# Patient Record
Sex: FEMALE | Race: WHITE | HISPANIC OR LATINO | Employment: UNEMPLOYED | URBAN - METROPOLITAN AREA
[De-identification: names, ages, dates, MRNs, and addresses within clinical notes are randomized per-mention and may not be internally consistent; named-entity substitution may affect disease eponyms.]

---

## 2024-11-19 ENCOUNTER — HOSPITAL ENCOUNTER (EMERGENCY)
Facility: HOSPITAL | Age: 29
Discharge: HOME | End: 2024-11-19
Attending: EMERGENCY MEDICINE
Payer: COMMERCIAL

## 2024-11-19 VITALS
OXYGEN SATURATION: 100 % | DIASTOLIC BLOOD PRESSURE: 77 MMHG | HEIGHT: 58 IN | BODY MASS INDEX: 26.24 KG/M2 | WEIGHT: 125 LBS | HEART RATE: 77 BPM | SYSTOLIC BLOOD PRESSURE: 119 MMHG | RESPIRATION RATE: 16 BRPM | TEMPERATURE: 98 F

## 2024-11-19 DIAGNOSIS — T14.8XXA MUSCLE STRAIN: ICD-10-CM

## 2024-11-19 DIAGNOSIS — F15.93 HEADACHE DUE TO CAFFEINE WITHDRAWAL SYNDROME: Primary | ICD-10-CM

## 2024-11-19 DIAGNOSIS — R11.2 NAUSEA AND VOMITING, UNSPECIFIED VOMITING TYPE: ICD-10-CM

## 2024-11-19 DIAGNOSIS — R10.9 FLANK PAIN: ICD-10-CM

## 2024-11-19 LAB
B-HCG UR QL: NEGATIVE
BILIRUB UR QL STRIP: NEGATIVE
CLARITY UR: ABNORMAL
COLOR UR: YELLOW
CTP QC/QA: YES
GLUCOSE UR QL STRIP: NEGATIVE
HGB UR QL STRIP: NEGATIVE
KETONES UR QL STRIP: NEGATIVE
LEUKOCYTE ESTERASE UR QL STRIP: NEGATIVE
NITRITE UR QL STRIP: NEGATIVE
PH UR STRIP: 8 [PH] (ref 5–8)
POCT GLUCOSE: 100 MG/DL (ref 70–110)
PROT UR QL STRIP: NEGATIVE
SP GR UR STRIP: 1.01 (ref 1–1.03)
URN SPEC COLLECT METH UR: ABNORMAL
UROBILINOGEN UR STRIP-ACNC: NEGATIVE EU/DL

## 2024-11-19 PROCEDURE — 99284 EMERGENCY DEPT VISIT MOD MDM: CPT

## 2024-11-19 PROCEDURE — 81025 URINE PREGNANCY TEST: CPT

## 2024-11-19 PROCEDURE — 25000003 PHARM REV CODE 250: Performed by: PHYSICIAN ASSISTANT

## 2024-11-19 PROCEDURE — 81003 URINALYSIS AUTO W/O SCOPE: CPT | Performed by: PHYSICIAN ASSISTANT

## 2024-11-19 PROCEDURE — 25000003 PHARM REV CODE 250

## 2024-11-19 PROCEDURE — 82962 GLUCOSE BLOOD TEST: CPT

## 2024-11-19 RX ORDER — BUTALBITAL, ACETAMINOPHEN AND CAFFEINE 50; 325; 40 MG/1; MG/1; MG/1
1 TABLET ORAL EVERY 6 HOURS PRN
Qty: 6 TABLET | Refills: 0 | Status: SHIPPED | OUTPATIENT
Start: 2024-11-19 | End: 2024-12-19

## 2024-11-19 RX ORDER — ONDANSETRON 4 MG/1
4 TABLET, ORALLY DISINTEGRATING ORAL 3 TIMES DAILY PRN
Qty: 15 TABLET | Refills: 0 | Status: SHIPPED | OUTPATIENT
Start: 2024-11-19

## 2024-11-19 RX ORDER — IBUPROFEN 600 MG/1
600 TABLET ORAL
Status: COMPLETED | OUTPATIENT
Start: 2024-11-19 | End: 2024-11-19

## 2024-11-19 RX ORDER — ACETAMINOPHEN 500 MG
500 TABLET ORAL
Status: COMPLETED | OUTPATIENT
Start: 2024-11-19 | End: 2024-11-19

## 2024-11-19 RX ORDER — ONDANSETRON 4 MG/1
4 TABLET, ORALLY DISINTEGRATING ORAL
Status: COMPLETED | OUTPATIENT
Start: 2024-11-19 | End: 2024-11-19

## 2024-11-19 RX ORDER — BUTALBITAL, ACETAMINOPHEN AND CAFFEINE 50; 325; 40 MG/1; MG/1; MG/1
2 TABLET ORAL
Status: COMPLETED | OUTPATIENT
Start: 2024-11-19 | End: 2024-11-19

## 2024-11-19 RX ORDER — NAPROXEN 500 MG/1
500 TABLET ORAL 2 TIMES DAILY PRN
Qty: 20 TABLET | Refills: 0 | Status: SHIPPED | OUTPATIENT
Start: 2024-11-19

## 2024-11-19 RX ADMIN — IBUPROFEN 600 MG: 600 TABLET, FILM COATED ORAL at 08:11

## 2024-11-19 RX ADMIN — ACETAMINOPHEN 500 MG: 500 TABLET ORAL at 07:11

## 2024-11-19 RX ADMIN — ONDANSETRON 4 MG: 4 TABLET, ORALLY DISINTEGRATING ORAL at 07:11

## 2024-11-19 RX ADMIN — BUTALBITAL, ACETAMINOPHEN AND CAFFEINE 2 TABLET: 325; 50; 40 TABLET ORAL at 08:11

## 2024-11-20 NOTE — DISCHARGE INSTRUCTIONS
Asegúrese de beber muchos líquidos, mantenerse hidratado y descansar lo suficiente.  Vigile nguyen consumo de cafeína, ya que es probable que le provoque erica de ino.  [place] todos los medicamentos según lo recetado. El naproxeno es un antiinflamatorio.  [place] angel medicamento con alimentos para evitar irritación del estómago.  Puede keyon entre 650 y 1000 mg de tylenol entre dosis para el dolor.  Si el dolor persiste, puede utilizar Fioricet, que es un cóctel para el dolor de ino.  Tenga en cuenta que angel medicamento es sedante y puede provocar aumento de la somnolencia.  No mezclar con alcohol ni ningún otro medicamento sedante.  No conduzca ni opere maquinaria mientras esté tomando angel medicamento.       El naproxeno y el Tylenol ayudan con la tensión muscular.  Utilice calor intermitente en la becky y estírese según lo tolere.  Regrese a la actividad normal a medida que los síntomas desaparezcan.  Debby un seguimiento con nguyen PCP para realizar paco evaluación adicional del manejo de los síntomas. Regrese a la anitra de emergencias si experimenta mareos intensos, fiebre superior a 100.4 que persiste después de la administración del medicamento, náuseas/vómitos o diarrea incontrolados o cualquier otra preocupación importante frank aumento del dolor, dolor en el pecho, dificultad para respirar, incapacidad para defecar o gases, o dificultad para respirar o hinchazón de la garganta/boca/lengua.

## 2024-11-20 NOTE — ED PROVIDER NOTES
"Encounter Date: 11/19/2024    SCRIBE #1 NOTE: I, Roslyn Cowan, am scribing for, and in the presence of,  Katalina Malloy PA-C. I have scribed the following portions of the note - Other sections scribed: HPI, ROS, PE.       History     Chief Complaint   Patient presents with    Headache     Pt c/o headache x1 week, and nausea, and vomiting since today. Pt denied fever, chills or diarrhea.     29 y.o. female with no PMHx, presents for emergent evaluation of a 8-9/10 constant, worsening "pressure" like headache located to bilateral temples and posterior head that began at 3 pm today while driving. Patient reports 2 similar episodes 1 week ago and 2 weeks ago lasting approximately 3-4 hours that spontaneously resolved. Patient also reports nausea, vomiting (2 episodes), and photophobia that began today.  She does report attempting to cut down on her coffee intake in the days headaches presented.  Denies changes in vision, head trauma, falls, dizziness or difficulty ambulating.  Patient also reports right sided flank pain that began 4 days ago and notes heavy lifting 3 days prior to onset. Patient has not taken any medications at this time. Patient denies fever/chills, chest pain, shortness of breath, abdominal pain, diarrhea, urinary concerns, myalgias, cough, congestion, rhinorrhea, or any other complaints at this time.  Reports right flank pain    The history is provided by the patient. A  was used ([facility] #[id number]).     Review of patient's allergies indicates:  No Known Allergies  History reviewed. No pertinent past medical history.  History reviewed. No pertinent surgical history.  No family history on file.  Social History     Tobacco Use    Smoking status: Never    Smokeless tobacco: Never   Substance Use Topics    Drug use: Never     Review of Systems   Constitutional:  Negative for chills and fever.   HENT:  Negative for rhinorrhea and sore throat.    Eyes:  Positive for photophobia. " Negative for pain and visual disturbance.   Respiratory:  Negative for cough and shortness of breath.    Cardiovascular:  Negative for chest pain.   Gastrointestinal:  Positive for nausea and vomiting. Negative for abdominal pain and diarrhea.   Genitourinary:  Positive for flank pain (right). Negative for decreased urine volume, difficulty urinating, dysuria, frequency, hematuria and urgency.   Musculoskeletal:  Negative for myalgias.   Skin:  Negative for rash.   Neurological:  Positive for headaches. Negative for weakness.   Psychiatric/Behavioral: Negative.         Physical Exam     Initial Vitals [11/19/24 1758]   BP Pulse Resp Temp SpO2   111/67 76 16 97.9 °F (36.6 °C) 100 %      MAP       --         Physical Exam    Nursing note and vitals reviewed.  Constitutional: She appears well-developed and well-nourished. She is not diaphoretic. No distress.   HENT:   Head: Normocephalic and atraumatic.   Right Ear: External ear normal.   Left Ear: External ear normal. Mouth/Throat: Oropharynx is clear and moist.   Eyes: Conjunctivae and EOM are normal. Pupils are equal, round, and reactive to light. No scleral icterus. Right eye exhibits no nystagmus. Left eye exhibits no nystagmus.   Neck: Neck supple.   Normal range of motion.  Cardiovascular:  Normal rate.           Pulmonary/Chest: No stridor. No respiratory distress.   Abdominal: Abdomen is soft. She exhibits no distension. There is no abdominal tenderness.   Reproducible tenderness to right flank.  No bruising, rashes, erythema or skin abnormalities.  No CVA tenderness.     No right CVA tenderness.  No left CVA tenderness. There is no rebound and no guarding.   Musculoskeletal:         General: Normal range of motion.      Cervical back: Normal range of motion and neck supple.      Comments: No midline cervical, thoracic or lumbar tenderness.       Lymphadenopathy:     She has no cervical adenopathy.   Neurological: She is alert and oriented to person, place,  "and time. She has normal strength. No cranial nerve deficit or sensory deficit. GCS score is 15. GCS eye subscore is 4. GCS verbal subscore is 5. GCS motor subscore is 6.   PERRL, EOMI w/out evidence of nystagmus, No deficits appreciated to light touch bilateral face, No facial weakness, no facial asymmetry. Eyebrow raise symmetric. Smile symmetric, Palate midline, and raises symmetrically, Shoulder shrug 5/5 bilaterally, tongue is midline w/out asymmetry. Strength 5/5 to bilateral upper and lower extremities, sensation intact to light touch     Skin: No rash noted.   Psychiatric: She has a normal mood and affect. Thought content normal.         ED Course   Procedures  Labs Reviewed   URINALYSIS, REFLEX TO URINE CULTURE - Abnormal       Result Value    Specimen UA Urine, Unspecified      Color, UA Yellow      Appearance, UA Hazy (*)     pH, UA 8.0      Specific Gravity, UA 1.015      Protein, UA Negative      Glucose, UA Negative      Ketones, UA Negative      Bilirubin (UA) Negative      Occult Blood UA Negative      Nitrite, UA Negative      Urobilinogen, UA Negative      Leukocytes, UA Negative      Narrative:     Specimen Source->Urine   POCT URINE PREGNANCY    POC Preg Test, Ur Negative       Acceptable Yes     POCT GLUCOSE    POCT Glucose 100            Imaging Results    None          Medications   ondansetron disintegrating tablet 4 mg (4 mg Oral Given 11/19/24 1938)   acetaminophen tablet 500 mg (500 mg Oral Given 11/19/24 1938)   butalbital-acetaminophen-caffeine -40 mg per tablet 2 tablet (2 tablets Oral Given 11/19/24 2050)   ibuprofen tablet 600 mg (600 mg Oral Given 11/19/24 2050)     Medical Decision Making  This is an evaluation of a 29 y.o. female that presents to the Emergency Department for 8-9/10 constant, worsening "pressure" like headache located to bilateral temples and posterior head that began at 3 pm today while driving.  Reports 2 other headaches similar in nature over " the last 2 weeks.  Reports right-sided positional and reproducible flank pain after lifting heavy objects.  Notes decreased caffeine intake on days of headache. The patient is a non-toxic, afebrile, and well appearing female. On physical exam, she has no focal weakness or neurological deficits. Has full ROM of neck with no nuchal rigidity or meningeal signs. There is no staggering or ataxic gait, vomiting, double vision, visual loss, slurred speech, numbness of the face or body, weakness, clumsiness, or incoordination.  Reproducible tenderness to right flank with no CVA or suprapubic tenderness, rashes, lesions or skin abnormalities.    Vital Signs are Reassuring.  RESULTS: UA is negative for infection, no nitrites, leukocytes, blood, or protein present.  Glucose within normal limits.    Given the above findings, my overall impression is caffeine withdrawal headache.  Given Zofran, ibuprofen and Fioricet in ED with great relief of headache.  Flank pain most consistent with musculoskeletal strain.     Differential Diagnosis included but was not limited to:  - SAH/IAH: not sudden onset or worst headache of life  - Epidural/subdural hematoma: no head injury  - CVA: normal neuro exam  - Temporal arteritis: no changes in vision, no temporal pain, headache not specifically unilateral  - Glaucoma: age inconsistent, eye exam wnl  - Meningitis: no neck stiffness, no fever    Remains neurologically intact.  Tolerating PO.  Ambulating without difficulty.  Discharged home with supportive care.  Emphasized importance of oral hydration.  Recommend slow reduction of caffeine to avoid withdrawal headaches.  Continue to monitor glucose at home.  Intermittent heat and NSAIDs for flank pain.  Strict return precautions discussed.  The diagnosis, treatment plan, instructions for follow-up and reevaluation with PCP as well as ED return precautions have been discussed with the patient and the patient has verbalized an understanding of the  information. All questions or concerns have been addressed.      Amount and/or Complexity of Data Reviewed  Independent Historian:      Details:   External Data Reviewed: labs, radiology and notes.  Labs: ordered. Decision-making details documented in ED Course.    Risk  OTC drugs.  Prescription drug management.  Diagnosis or treatment significantly limited by social determinants of health.            Scribe Attestation:   Scribe #1: I performed the above scribed service and the documentation accurately describes the services I performed. I attest to the accuracy of the note.        ED Course as of 11/21/24 1945 Tue Nov 19, 2024 2113 On re-evaluation, patient states headache 2/10.  Nausea resolved.  Remains neurologically intact.  Vitals reassuring. [CC]      ED Course User Index  [CC] Katalina Malloy PA-C I, Carly Caballero, PA-C, personally performed the services described in this documentation. All medical record entries made by the scribe were at my direction and in my presence. I have reviewed the chart and agree that the record reflects my personal performance and is accurate and complete.      DISCLAIMER: This note was prepared with [employer] voice recognition transcription software. Garbled syntax, mangled pronouns, and other bizarre constructions may be attributed to that software system.    Clinical Impression:  Final diagnoses:  [F15.93] Headache due to caffeine withdrawal syndrome (Primary)  [R11.2] Nausea and vomiting, unspecified vomiting type  [T14.8XXA] Muscle strain  [R10.9] Flank pain          ED Disposition Condition    Discharge Stable          ED Prescriptions       Medication Sig Dispense Start Date End Date Auth. Provider    ondansetron (ZOFRAN-ODT) 4 MG TbDL Take 1 tablet (4 mg total) by mouth 3 (three) times daily as needed (Nausea). 15 tablet 11/19/2024 -- Katalina Malloy PA-C    naproxen (NAPROSYN) 500 MG tablet Take 1 tablet (500 mg total)  by mouth 2 (two) times daily as needed (Headache, pain). 20 tablet 11/19/2024 -- Katalina Malloy PA-C    butalbital-acetaminophen-caffeine -40 mg (FIORICET, ESGIC) -40 mg per tablet Take 1 tablet by mouth every 6 (six) hours as needed for Headaches. 6 tablet 11/19/2024 12/19/2024 Katalina Malloy PA-C          Follow-up Information       Follow up With Specialties Details Why Contact Info    [facility] - Emergency Dept Emergency Medicine Go to  For new or worsening symptoms [address] [facility] [place] [address] [phone]    [facility] -  Schedule an appointment as soon as possible for a visit   [address] [place] [address]  [phone]               Katalina Malloy PA-C  11/21/24 1946

## 2024-11-20 NOTE — ED TRIAGE NOTES
Deedee Blanton, a 29 y.o. female presents to the ED w/ complaint of occipital headache x 1 week. Reports associated n/v x today. No meds PTA. Denies any other complaints. Pt is AAOX4, VSS, and NADN.